# Patient Record
Sex: MALE | Race: OTHER | NOT HISPANIC OR LATINO | Employment: OTHER | URBAN - METROPOLITAN AREA
[De-identification: names, ages, dates, MRNs, and addresses within clinical notes are randomized per-mention and may not be internally consistent; named-entity substitution may affect disease eponyms.]

---

## 2021-06-13 ENCOUNTER — NURSE TRIAGE (OUTPATIENT)
Dept: HEALTH INFORMATION MANAGEMENT | Facility: OTHER | Age: 26
End: 2021-06-13

## 2021-06-13 ENCOUNTER — HOSPITAL ENCOUNTER (EMERGENCY)
Facility: MEDICAL CENTER | Age: 26
End: 2021-06-13
Attending: EMERGENCY MEDICINE
Payer: MEDICAID

## 2021-06-13 ENCOUNTER — APPOINTMENT (OUTPATIENT)
Dept: RADIOLOGY | Facility: MEDICAL CENTER | Age: 26
End: 2021-06-13
Attending: EMERGENCY MEDICINE
Payer: MEDICAID

## 2021-06-13 VITALS
BODY MASS INDEX: 24.27 KG/M2 | RESPIRATION RATE: 20 BRPM | HEART RATE: 84 BPM | TEMPERATURE: 98 F | SYSTOLIC BLOOD PRESSURE: 132 MMHG | HEIGHT: 70 IN | WEIGHT: 169.53 LBS | OXYGEN SATURATION: 98 % | DIASTOLIC BLOOD PRESSURE: 61 MMHG

## 2021-06-13 DIAGNOSIS — S42.151A CLOSED FRACTURE OF GLENOID CAVITY AND NECK OF RIGHT SCAPULA, INITIAL ENCOUNTER: ICD-10-CM

## 2021-06-13 DIAGNOSIS — S43.004A DISLOCATION OF RIGHT SHOULDER JOINT, INITIAL ENCOUNTER: ICD-10-CM

## 2021-06-13 DIAGNOSIS — S42.141A CLOSED FRACTURE OF GLENOID CAVITY AND NECK OF RIGHT SCAPULA, INITIAL ENCOUNTER: ICD-10-CM

## 2021-06-13 PROCEDURE — 96374 THER/PROPH/DIAG INJ IV PUSH: CPT | Mod: XU

## 2021-06-13 PROCEDURE — 99285 EMERGENCY DEPT VISIT HI MDM: CPT

## 2021-06-13 PROCEDURE — 73030 X-RAY EXAM OF SHOULDER: CPT | Mod: RT

## 2021-06-13 PROCEDURE — 23650 CLTX SHO DSLC W/MNPJ WO ANES: CPT

## 2021-06-13 PROCEDURE — 96375 TX/PRO/DX INJ NEW DRUG ADDON: CPT | Mod: XU

## 2021-06-13 PROCEDURE — 700111 HCHG RX REV CODE 636 W/ 250 OVERRIDE (IP): Performed by: EMERGENCY MEDICINE

## 2021-06-13 PROCEDURE — 94799 UNLISTED PULMONARY SVC/PX: CPT

## 2021-06-13 RX ORDER — PROPOFOL 10 MG/ML
200 INJECTION, EMULSION INTRAVENOUS ONCE
Status: DISCONTINUED | OUTPATIENT
Start: 2021-06-13 | End: 2021-06-13 | Stop reason: HOSPADM

## 2021-06-13 RX ORDER — PROPOFOL 10 MG/ML
INJECTION, EMULSION INTRAVENOUS
Status: COMPLETED | OUTPATIENT
Start: 2021-06-13 | End: 2021-06-13

## 2021-06-13 RX ORDER — MESALAMINE 1.2 G/1
1.2 TABLET, DELAYED RELEASE ORAL
COMMUNITY

## 2021-06-13 RX ORDER — HYDROCODONE BITARTRATE AND ACETAMINOPHEN 5; 325 MG/1; MG/1
1 TABLET ORAL EVERY 6 HOURS PRN
Qty: 10 TABLET | Refills: 0 | Status: SHIPPED | OUTPATIENT
Start: 2021-06-13 | End: 2021-06-16

## 2021-06-13 RX ORDER — ONDANSETRON 2 MG/ML
4 INJECTION INTRAMUSCULAR; INTRAVENOUS ONCE
Status: COMPLETED | OUTPATIENT
Start: 2021-06-13 | End: 2021-06-13

## 2021-06-13 RX ADMIN — PROPOFOL 25 MG: 10 INJECTION, EMULSION INTRAVENOUS at 15:06

## 2021-06-13 RX ADMIN — PROPOFOL 25 MG: 10 INJECTION, EMULSION INTRAVENOUS at 15:11

## 2021-06-13 RX ADMIN — PROPOFOL 25 MG: 10 INJECTION, EMULSION INTRAVENOUS at 15:05

## 2021-06-13 RX ADMIN — PROPOFOL 50 MG: 10 INJECTION, EMULSION INTRAVENOUS at 15:04

## 2021-06-13 RX ADMIN — PROPOFOL 25 MG: 10 INJECTION, EMULSION INTRAVENOUS at 15:08

## 2021-06-13 RX ADMIN — PROPOFOL 25 MG: 10 INJECTION, EMULSION INTRAVENOUS at 15:10

## 2021-06-13 RX ADMIN — FENTANYL CITRATE 50 MCG: 50 INJECTION, SOLUTION INTRAMUSCULAR; INTRAVENOUS at 14:00

## 2021-06-13 RX ADMIN — ONDANSETRON 4 MG: 2 INJECTION INTRAMUSCULAR; INTRAVENOUS at 13:59

## 2021-06-13 ASSESSMENT — PAIN DESCRIPTION - PAIN TYPE: TYPE: ACUTE PAIN

## 2021-06-13 NOTE — FLOWSHEET NOTE
06/13/21 1514   Events/Summary/Plan   Events/Summary/Plan RT at bedside for concious sedation tolerated well    Vital Signs   Pulse 86   Respiration 18   Pulse Oximetry 98 %   CO2 Monitoring   ETCO2 (mmHg) 32   $ ETCO2 Monitoring Yes

## 2021-06-13 NOTE — ED NOTES
All DC discussed. Pt verbalized understanding of all DC instructions r/t shoulder dislocation, prescriptions including opioid medications and consent and follow up recommendations. Pt ambulated to lobby with upright and steady gait. Sling applied to R shoulder.

## 2021-06-13 NOTE — ED NOTES
Pt had vagal response to IV insertion. Per pt felt faint. Clammy upon assessment. Pt repositioned to lying position, VS taken.

## 2021-06-13 NOTE — ED TRIAGE NOTES
To triage via w/c with report of   Chief Complaint   Patient presents with   • T-5000 Extremity Pain     right shoulder, possible dislocation   reports was practicing [employer] today.  Reports Hx of right shoulder dislocation with surgical intervention.  Reports today feels different than previous dislocations.  Difficult to assess for deformity due to pt position and pain tolerance in triage.  CMS intact with limited movement due to pain

## 2021-06-13 NOTE — DISCHARGE INSTRUCTIONS
Do not try to raise your arm as you may dislocate your arm again.  You must follow-up with orthopedics concerning the small fracture.  Take Tylenol or ibuprofen for mild pain.  Take the narcotic pain medication for severe pain.  Ice your arm.  Any numbness tingling or worsening pain return.

## 2021-06-13 NOTE — ED NOTES
Pt from ED lobby to B13. Pt reports sudden onset of pain when grabbling. Right shoulder pain. ROM limited by pain. Pt report position of comfort is arm held out in front of him.

## 2021-06-13 NOTE — ED PROVIDER NOTES
ED Provider Note    Scribed for Bree Ribeiro M.D. by Gideon Anderson. 6/13/2021, 1:46 PM.    Primary care provider: PCP, Pt states none  Means of arrival: Wheel chair  History obtained from: Patient  History limited by: None    CHIEF COMPLAINT  Chief Complaint   Patient presents with   • T-5000 Extremity Pain     right shoulder, possible dislocation       HPI  John Paul Shoemaker is a 25 y.o. male, with a history of shoulder dislocation, who presents to the Emergency Department for ongoing right shoulder pain with an onset of earlier today. The patient states he was practicing [employer] today when he injured his shoulder. He states he had a previous dislocation, but his symptoms today does feel like a dislocation. Keeping his right arm up in an L position is a mild alleviating factor. Exacerbating factors include rotating his shoulder to touch the left side of his chest. He reports associated dizziness secondary to pain. He denies any associated weakness, numbness, or tingling. The patient had a sandwich this morning for breakfast.     REVIEW OF SYSTEMS  Pertinent positives include right shoulder pain, and dizziness. Pertinent negatives include no weakness, numbness, tingling. All other systems reviewed and negative.     PAST MEDICAL HISTORY   has a past medical history of Migraines, Social anxiety disorder, and Ulcerative colitis (HCC).    SURGICAL HISTORY   has a past surgical history that includes shoulder arthroscopy (Right).    SOCIAL HISTORY  Social History     Tobacco Use   • Smoking status: Never Smoker   • Smokeless tobacco: Never Used   Vaping Use   • Vaping Use: Never used   Substance Use Topics   • Alcohol use: Yes     Comment: occ   • Drug use: Never      Social History     Substance and Sexual Activity   Drug Use Never       FAMILY HISTORY  History reviewed. No pertinent family history.    CURRENT MEDICATIONS  Home Medications    **Home medications have not yet been reviewed for this encounter**          ALLERGIES  No Known Allergies    PHYSICAL EXAM  VITAL SIGNS: /71   Pulse 94   Temp 36.7 °C (98 °F) (Temporal)   Resp 16   Wt 76.9 kg (169 lb 8.5 oz)   SpO2 98% Comment: Ra    Constitutional: Well developed, Well nourished, No acute distress, Non-toxic appearance.   Cardiovascular: Good pulses on the affected extremity. Good capillary refill.  Thorax & Lungs: No respiratory distress  Skin: No abrasions  Musculoskeletal: Obvious deformity in the right shoulder, difficulty abducting the right arm. Intact sensations, good  strength, good pulses.   Neurologic: Good sensation to light touch on the distal affected extremity.                                                    DIAGNOSTIC STUDIES / PROCEDURES\    RADIOLOGY  DX-SHOULDER 2+ RIGHT   Final Result         Interval reduction of the right shoulder dislocation.   Acute mildly displaced fracture of the anteroinferior glenoid      DX-SHOULDER 2+ RIGHT   Final Result      Anterior shoulder dislocation.               INTERPRETING LOCATION:  [address] [place] [address], [facility]        The radiologist's interpretation of all radiological studies have been reviewed by me.    Conscious Sedation Procedure Note    Indication: shoulder dislocation    Consent: I have discussed with the patient and/or the patient representative the indication, alternatives, and the possible risks and/or complications of the planned procedure and the anesthesia methods. The patient and/or patient representative appear to understand and agree to proceed.    Physician Involvement: The attending physician was present and supervising this procedure.    Pre-Sedation Documentation and Exam: I have personally completed a history, physical exam & review of systems for this patient (see notes).  Airway Assessment: normal  f3  Prior History of Anesthesia Complications: none    ASA Classification: Class 1 - A normal healthy patient    Sedation/ Anesthesia Plan: intravenous  sedation    Medications Used: propofol intravenously    Monitoring and Safety: The patient was placed on a cardiac monitor and vital signs, pulse oximetry and level of consciousness were continuously evaluated throughout the procedure. The patient was closely monitored until recovery from the medications was complete and the patient had returned to baseline status. Respiratory therapy was on standby at all times during the procedure.      (The following sections must be completed)  Post-Sedation Vital Signs: Vital signs were reviewed and were stable after the procedure (see flow sheet for vitals)            Intraservice Time: Greater than 10 minutes    Post-Sedation Exam: Lungs: clear           Complications: none    I provided both the sedation and procedure, a nurse was present at the bedside for the entire procedure.     Joint Reduction Procedure Note    Indication: Joint dislocation    Consent: The patient was counseled regarding the procedure, it's indications, risks, potential complications and alternatives and any questions were answered. Consent was obtained.    Procedure: The pre-reduction exam showed distal perfusion & neurologic function to be normal. The patient was placed in the sitting position. Anesthesia/pain control was obtained using conscious sedation -SEE CONSCIOUS SEDATION NOTE FOR DETAILS. Reduction of the right shoulder was performed by direct traction. Post reduction films were obtained and revealed satisfactory reduction. A post-reduction exam revealed distal perfusion & neurologic function to be normal. The affected area was immobilized with an arm sling.    The patient tolerated the procedure well.    Complications: None    COURSE & MEDICAL DECISION MAKING  Nursing notes, VS, PMSFHx reviewed in chart.     1:46 PM Patient seen and examined at bedside. Discussed plan of care with patient. I informed them that imaging will be ordered to evaluate symptoms. Patient is understanding and  agreeable with plan.   Ordered for DX right shoulder to evaluate. Patient was treated with Zofran 4 mg, and Fentanyl 50 mcg for his symptoms.    he presented to the emergency department with an obvious shoulder dislocation.  Patient was neurovascularly intact.  See procedure note for sedation and reduction of dislocation.  Very gentle traction was applied and it was easily reduced.  The post reduction x-ray showed evidence of a fracture however I do not think this was related to the reduction as it really did not pull very hard and essentially is a muscles relaxed when he was given the propofol and it went easily in place.  He is neurovascular intact after the reduction.  He is awake and alert.  He does not currently have any pain.  He is advised to follow-up with orthopedics.    In prescribing controlled substances to this patient, I certify that I have obtained and reviewed the medical history of John Paul Shoemaker. I have also made a good bernie effort to obtain applicable records from other providers who have treated the patient and no other records are available at this time.     I have conducted a physical exam and documented it. I have reviewed Mr. Shoemaker’s prescription history as maintained by the [place] Prescription Monitoring Program.     I have assessed the patient’s risk for abuse, dependency, and addiction using the validated Opioid Risk Tool available at https://www.mdcalc.com/bssxmj-wpfy-urup-ort-narcotic-abuse.     Given the above, I believe the benefits of controlled substance therapy outweigh the risks. The reasons for prescribing controlled substances include non-narcotic, oral analgesic alternatives have been inadequate for pain control. Accordingly, I have discussed the risk and benefits, treatment plan, and alternative therapies with the patient.     FINAL IMPRESSION  1. Dislocation of right shoulder joint, initial encounter    2. Closed fracture of glenoid cavity and neck of right scapula,  initial encounter       Conscious sedation  Joint reduction     Gideon GUERRERO (Scribe), am scribing for, and in the presence of, Bree Ribeiro M.D..    Electronically signed by: Gideon Anderson (Cuco), 6/13/2021    Bree GUERRERO M.D. personally performed the services described in this documentation, as scribed by Gideon Anderson in my presence, and it is both accurate and complete.    C    The note accurately reflects work and decisions made by me.  Bree Ribeiro M.D.  6/13/2021  4:06 PM

## 2021-06-13 NOTE — TELEPHONE ENCOUNTER
"Pt teacher calling in on behalf of pt. No personal information given d/t inability to speak with pt. Teacher believes shoulder is poppped out of place. Has happeend to pt before but unable to get it popped back in himself this time. Advised on how to position arm and to take to ER now. If unable to get pt positioned and into car, advised to call 911 for transportation. Teacher agrees.     Reason for Disposition  • Looks like a dislocated joint    Additional Information  • Negative: Serious injury with multiple fractures  • Negative: [1] Major bleeding (e.g., actively dripping or spurting) AND [2] can't be stopped  • Negative: Bullet wound, stabbed by knife, or other serious penetrating wound  • Negative: Sounds like a life-threatening emergency to the triager  • Negative: Wound looks infected  • Negative: Shoulder pain from overuse (work, exercise, gardening) OR from self-induced lifting injury  • Negative: Shoulder pain not from an injury  • Negative: Looks like a broken bone (crooked or deformed)    Answer Assessment - Initial Assessment Questions  1. MECHANISM: \"How did the injury happen?\"      unsure  2. ONSET: \"When did the injury happen?\" (Minutes or hours ago)       Few minutes ago  3. APPEARANCE of INJURY: \"What does the injury look like?\"       Out of place  4. SEVERITY: \"Can you move the shoulder normally?\"       no  5. SIZE: For cuts, bruises, or swelling, ask: \"How large is it?\" (e.g., inches or centimeters;  entire joint)       unsure  6. PAIN: \"Is there pain?\" If so, ask: \"How bad is the pain?\"   (e.g., Scale 1-10; or mild, moderate, severe)      severe  7. TETANUS: For any breaks in the skin, ask: \"When was the last tetanus booster?\"      unsure  8. OTHER SYMPTOMS: \"Do you have any other symptoms?\" (e.g., loss of sensation)      no  9. PREGNANCY: \"Is there any chance you are pregnant?\" \"When was your last menstrual period?\"      no    Protocols used: SHOULDER INJURY-A-AH      "

## 2021-06-13 NOTE — ED NOTES
Reduction completed by Dr. Ribeiro at 1510. Pt awake, alert, and conversant at this time. CMS intact to R hand, sling in place

## 2021-06-15 ENCOUNTER — NURSE TRIAGE (OUTPATIENT)
Dept: HEALTH INFORMATION MANAGEMENT | Facility: OTHER | Age: 26
End: 2021-06-15

## 2021-06-15 NOTE — TELEPHONE ENCOUNTER
Pt was in ED @ [facility] for dislocated shoulder on 6/13, was to receive an orthopedic consult & have not heard from same. Triage nurse called Dr. Acevedo's office & left phone message w/triage nurse @ that office.

## 2021-06-17 PROBLEM — M24.411 CHRONIC DISLOCATION OF RIGHT SHOULDER: Status: ACTIVE | Noted: 2021-06-17

## 2021-07-12 ENCOUNTER — HOSPITAL ENCOUNTER (OUTPATIENT)
Dept: LAB | Facility: MEDICAL CENTER | Age: 26
End: 2021-07-12
Attending: STUDENT IN AN ORGANIZED HEALTH CARE EDUCATION/TRAINING PROGRAM
Payer: MEDICAID

## 2021-07-12 LAB
ALBUMIN SERPL BCP-MCNC: 4.7 G/DL (ref 3.2–4.9)
ALBUMIN/GLOB SERPL: 1.9 G/DL
ALP SERPL-CCNC: 61 U/L (ref 30–99)
ALT SERPL-CCNC: 18 U/L (ref 2–50)
ANION GAP SERPL CALC-SCNC: 8 MMOL/L (ref 7–16)
AST SERPL-CCNC: 22 U/L (ref 12–45)
BASOPHILS # BLD AUTO: 1.2 % (ref 0–1.8)
BASOPHILS # BLD: 0.06 K/UL (ref 0–0.12)
BILIRUB SERPL-MCNC: 1.2 MG/DL (ref 0.1–1.5)
BUN SERPL-MCNC: 16 MG/DL (ref 8–22)
CALCIUM SERPL-MCNC: 9.6 MG/DL (ref 8.5–10.5)
CHLORIDE SERPL-SCNC: 102 MMOL/L (ref 96–112)
CHOLEST SERPL-MCNC: 216 MG/DL (ref 100–199)
CO2 SERPL-SCNC: 28 MMOL/L (ref 20–33)
CREAT SERPL-MCNC: 1.06 MG/DL (ref 0.5–1.4)
EOSINOPHIL # BLD AUTO: 0.06 K/UL (ref 0–0.51)
EOSINOPHIL NFR BLD: 1.2 % (ref 0–6.9)
ERYTHROCYTE [DISTWIDTH] IN BLOOD BY AUTOMATED COUNT: 38.9 FL (ref 35.9–50)
GLOBULIN SER CALC-MCNC: 2.5 G/DL (ref 1.9–3.5)
GLUCOSE SERPL-MCNC: 87 MG/DL (ref 65–99)
HCT VFR BLD AUTO: 48.3 % (ref 42–52)
HDLC SERPL-MCNC: 76 MG/DL
HGB BLD-MCNC: 15.3 G/DL (ref 14–18)
IMM GRANULOCYTES # BLD AUTO: 0.02 K/UL (ref 0–0.11)
IMM GRANULOCYTES NFR BLD AUTO: 0.4 % (ref 0–0.9)
LDLC SERPL CALC-MCNC: 127 MG/DL
LYMPHOCYTES # BLD AUTO: 2.06 K/UL (ref 1–4.8)
LYMPHOCYTES NFR BLD: 39.9 % (ref 22–41)
MCH RBC QN AUTO: 26.3 PG (ref 27–33)
MCHC RBC AUTO-ENTMCNC: 31.7 G/DL (ref 33.7–35.3)
MCV RBC AUTO: 83.1 FL (ref 81.4–97.8)
MONOCYTES # BLD AUTO: 0.3 K/UL (ref 0–0.85)
MONOCYTES NFR BLD AUTO: 5.8 % (ref 0–13.4)
NEUTROPHILS # BLD AUTO: 2.66 K/UL (ref 1.82–7.42)
NEUTROPHILS NFR BLD: 51.5 % (ref 44–72)
NRBC # BLD AUTO: 0 K/UL
NRBC BLD-RTO: 0 /100 WBC
PLATELET # BLD AUTO: 239 K/UL (ref 164–446)
PMV BLD AUTO: 10.3 FL (ref 9–12.9)
POTASSIUM SERPL-SCNC: 4.2 MMOL/L (ref 3.6–5.5)
PROT SERPL-MCNC: 7.2 G/DL (ref 6–8.2)
RBC # BLD AUTO: 5.81 M/UL (ref 4.7–6.1)
SODIUM SERPL-SCNC: 138 MMOL/L (ref 135–145)
TRIGL SERPL-MCNC: 65 MG/DL (ref 0–149)
TSH SERPL DL<=0.005 MIU/L-ACNC: 1.11 UIU/ML (ref 0.38–5.33)
WBC # BLD AUTO: 5.2 K/UL (ref 4.8–10.8)

## 2021-07-12 PROCEDURE — 84443 ASSAY THYROID STIM HORMONE: CPT

## 2021-07-12 PROCEDURE — 80061 LIPID PANEL: CPT

## 2021-07-12 PROCEDURE — 80053 COMPREHEN METABOLIC PANEL: CPT

## 2021-07-12 PROCEDURE — 85025 COMPLETE CBC W/AUTO DIFF WBC: CPT

## 2021-07-12 PROCEDURE — 36415 COLL VENOUS BLD VENIPUNCTURE: CPT

## 2021-07-16 ENCOUNTER — TELEMEDICINE (OUTPATIENT)
Dept: ADMISSIONS | Facility: MEDICAL CENTER | Age: 26
End: 2021-07-16
Attending: ORTHOPAEDIC SURGERY
Payer: MEDICAID

## 2021-07-16 RX ORDER — IBUPROFEN 600 MG/1
600 TABLET ORAL EVERY 6 HOURS PRN
COMMUNITY

## 2021-07-16 RX ORDER — ACETAMINOPHEN 325 MG/1
TABLET ORAL
COMMUNITY
End: 2021-07-16

## 2021-07-16 RX ORDER — FLUOXETINE 10 MG/1
10 CAPSULE ORAL
COMMUNITY
Start: 2021-06-11 | End: 2021-07-16

## 2021-07-16 RX ORDER — FLUOXETINE HYDROCHLORIDE 20 MG/1
CAPSULE ORAL
COMMUNITY
Start: 2021-06-15 | End: 2022-01-27 | Stop reason: SDUPTHER

## 2021-07-16 NOTE — OR NURSING
"Preadmit appointment: \" Preparing for your Procedure information\" sheet given to patient with verbal and written instructions. Patient instructed to continue prescribed medications through the day before surgery, instructed to take the following medications the day of surgery per anesthesia protocol: NONE          Verbal and written instructions on [facility]'s recommendation to wear a mask in public and with persons known to not to had had the Covid-19 vaccination prior to surgery and covid symptoms to watch for given to patient, pt advised to notify MD if any symptoms develop.        "

## 2021-07-28 ENCOUNTER — ANESTHESIA EVENT (OUTPATIENT)
Dept: SURGERY | Facility: MEDICAL CENTER | Age: 26
End: 2021-07-28
Payer: MEDICAID

## 2021-07-28 ENCOUNTER — ANESTHESIA (OUTPATIENT)
Dept: SURGERY | Facility: MEDICAL CENTER | Age: 26
End: 2021-07-28
Payer: MEDICAID

## 2021-07-28 ENCOUNTER — HOSPITAL ENCOUNTER (OUTPATIENT)
Facility: MEDICAL CENTER | Age: 26
End: 2021-07-28
Attending: ORTHOPAEDIC SURGERY | Admitting: ORTHOPAEDIC SURGERY
Payer: MEDICAID

## 2021-07-28 VITALS
WEIGHT: 162.26 LBS | DIASTOLIC BLOOD PRESSURE: 75 MMHG | BODY MASS INDEX: 23.28 KG/M2 | TEMPERATURE: 97.3 F | SYSTOLIC BLOOD PRESSURE: 131 MMHG | RESPIRATION RATE: 16 BRPM | HEART RATE: 83 BPM | OXYGEN SATURATION: 98 %

## 2021-07-28 DIAGNOSIS — G89.18 POSTOPERATIVE PAIN: ICD-10-CM

## 2021-07-28 DIAGNOSIS — M24.411 CHRONIC DISLOCATION OF RIGHT SHOULDER: ICD-10-CM

## 2021-07-28 PROCEDURE — 700102 HCHG RX REV CODE 250 W/ 637 OVERRIDE(OP): Performed by: ORTHOPAEDIC SURGERY

## 2021-07-28 PROCEDURE — 700101 HCHG RX REV CODE 250: Performed by: ANESTHESIOLOGY

## 2021-07-28 PROCEDURE — 64415 NJX AA&/STRD BRCH PLXS IMG: CPT | Performed by: ORTHOPAEDIC SURGERY

## 2021-07-28 PROCEDURE — 160029 HCHG SURGERY MINUTES - 1ST 30 MINS LEVEL 4: Performed by: ORTHOPAEDIC SURGERY

## 2021-07-28 PROCEDURE — 160025 RECOVERY II MINUTES (STATS): Performed by: ORTHOPAEDIC SURGERY

## 2021-07-28 PROCEDURE — 160048 HCHG OR STATISTICAL LEVEL 1-5: Performed by: ORTHOPAEDIC SURGERY

## 2021-07-28 PROCEDURE — 29827 SHO ARTHRS SRG RT8TR CUF RPR: CPT | Mod: ASROC,RT | Performed by: PHYSICIAN ASSISTANT

## 2021-07-28 PROCEDURE — 20680 REMOVAL OF IMPLANT DEEP: CPT | Mod: ASROC,51ROC | Performed by: PHYSICIAN ASSISTANT

## 2021-07-28 PROCEDURE — 700101 HCHG RX REV CODE 250: Performed by: ORTHOPAEDIC SURGERY

## 2021-07-28 PROCEDURE — 20680 REMOVAL OF IMPLANT DEEP: CPT | Mod: 51ROC | Performed by: ORTHOPAEDIC SURGERY

## 2021-07-28 PROCEDURE — A9270 NON-COVERED ITEM OR SERVICE: HCPCS | Performed by: ORTHOPAEDIC SURGERY

## 2021-07-28 PROCEDURE — 160046 HCHG PACU - 1ST 60 MINS PHASE II: Performed by: ORTHOPAEDIC SURGERY

## 2021-07-28 PROCEDURE — 700105 HCHG RX REV CODE 258: Performed by: ORTHOPAEDIC SURGERY

## 2021-07-28 PROCEDURE — 501838 HCHG SUTURE GENERAL: Performed by: ORTHOPAEDIC SURGERY

## 2021-07-28 PROCEDURE — C1713 ANCHOR/SCREW BN/BN,TIS/BN: HCPCS | Performed by: ORTHOPAEDIC SURGERY

## 2021-07-28 PROCEDURE — 160036 HCHG PACU - EA ADDL 30 MINS PHASE I: Performed by: ORTHOPAEDIC SURGERY

## 2021-07-28 PROCEDURE — 700102 HCHG RX REV CODE 250 W/ 637 OVERRIDE(OP): Performed by: ANESTHESIOLOGY

## 2021-07-28 PROCEDURE — 160002 HCHG RECOVERY MINUTES (STAT): Performed by: ORTHOPAEDIC SURGERY

## 2021-07-28 PROCEDURE — 29827 SHO ARTHRS SRG RT8TR CUF RPR: CPT | Mod: 51ROC,59 | Performed by: ORTHOPAEDIC SURGERY

## 2021-07-28 PROCEDURE — 502581 HCHG PACK, SHOULDER ARTHROSCOPY: Performed by: ORTHOPAEDIC SURGERY

## 2021-07-28 PROCEDURE — 160041 HCHG SURGERY MINUTES - EA ADDL 1 MIN LEVEL 4: Performed by: ORTHOPAEDIC SURGERY

## 2021-07-28 PROCEDURE — 29806 SHO ARTHRS SRG CAPSULORRAPHY: CPT | Mod: RT | Performed by: ORTHOPAEDIC SURGERY

## 2021-07-28 PROCEDURE — 160009 HCHG ANES TIME/MIN: Performed by: ORTHOPAEDIC SURGERY

## 2021-07-28 PROCEDURE — 700111 HCHG RX REV CODE 636 W/ 250 OVERRIDE (IP): Performed by: ANESTHESIOLOGY

## 2021-07-28 PROCEDURE — 502000 HCHG MISC OR IMPLANTS RC 0278: Performed by: ORTHOPAEDIC SURGERY

## 2021-07-28 PROCEDURE — A9270 NON-COVERED ITEM OR SERVICE: HCPCS | Performed by: ANESTHESIOLOGY

## 2021-07-28 PROCEDURE — 160035 HCHG PACU - 1ST 60 MINS PHASE I: Performed by: ORTHOPAEDIC SURGERY

## 2021-07-28 DEVICE — ANCHOR SUTURE ICONIX 3 WITH 3 STRANDS #2 FORCE FIBER 2.3MM (5EA/BX): Type: IMPLANTABLE DEVICE | Site: SHOULDER | Status: FUNCTIONAL

## 2021-07-28 DEVICE — IMPLANTABLE DEVICE: Type: IMPLANTABLE DEVICE | Site: SHOULDER | Status: FUNCTIONAL

## 2021-07-28 RX ORDER — OXYCODONE HYDROCHLORIDE AND ACETAMINOPHEN 5; 325 MG/1; MG/1
1 TABLET ORAL EVERY 6 HOURS PRN
Qty: 40 TABLET | Refills: 0 | Status: SHIPPED | OUTPATIENT
Start: 2021-07-28 | End: 2021-08-04

## 2021-07-28 RX ORDER — HYDROMORPHONE HYDROCHLORIDE 1 MG/ML
0.1 INJECTION, SOLUTION INTRAMUSCULAR; INTRAVENOUS; SUBCUTANEOUS
Status: DISCONTINUED | OUTPATIENT
Start: 2021-07-28 | End: 2021-07-28 | Stop reason: HOSPADM

## 2021-07-28 RX ORDER — OXYCODONE HCL 5 MG/5 ML
5 SOLUTION, ORAL ORAL
Status: COMPLETED | OUTPATIENT
Start: 2021-07-28 | End: 2021-07-28

## 2021-07-28 RX ORDER — HYDRALAZINE HYDROCHLORIDE 20 MG/ML
5 INJECTION INTRAMUSCULAR; INTRAVENOUS
Status: DISCONTINUED | OUTPATIENT
Start: 2021-07-28 | End: 2021-07-28 | Stop reason: HOSPADM

## 2021-07-28 RX ORDER — HYDROMORPHONE HYDROCHLORIDE 1 MG/ML
0.2 INJECTION, SOLUTION INTRAMUSCULAR; INTRAVENOUS; SUBCUTANEOUS
Status: DISCONTINUED | OUTPATIENT
Start: 2021-07-28 | End: 2021-07-28 | Stop reason: HOSPADM

## 2021-07-28 RX ORDER — MIDAZOLAM HYDROCHLORIDE 1 MG/ML
INJECTION INTRAMUSCULAR; INTRAVENOUS PRN
Status: DISCONTINUED | OUTPATIENT
Start: 2021-07-28 | End: 2021-07-28 | Stop reason: SURG

## 2021-07-28 RX ORDER — SODIUM CHLORIDE, SODIUM LACTATE, POTASSIUM CHLORIDE, CALCIUM CHLORIDE 600; 310; 30; 20 MG/100ML; MG/100ML; MG/100ML; MG/100ML
INJECTION, SOLUTION INTRAVENOUS CONTINUOUS
Status: DISCONTINUED | OUTPATIENT
Start: 2021-07-28 | End: 2021-07-28 | Stop reason: HOSPADM

## 2021-07-28 RX ORDER — HYDROMORPHONE HYDROCHLORIDE 1 MG/ML
0.4 INJECTION, SOLUTION INTRAMUSCULAR; INTRAVENOUS; SUBCUTANEOUS
Status: DISCONTINUED | OUTPATIENT
Start: 2021-07-28 | End: 2021-07-28 | Stop reason: HOSPADM

## 2021-07-28 RX ORDER — ONDANSETRON 4 MG/1
4 TABLET, FILM COATED ORAL EVERY 4 HOURS PRN
Qty: 20 TABLET | Refills: 0 | Status: SHIPPED | OUTPATIENT
Start: 2021-07-28

## 2021-07-28 RX ORDER — CEFAZOLIN SODIUM 1 G/3ML
INJECTION, POWDER, FOR SOLUTION INTRAMUSCULAR; INTRAVENOUS PRN
Status: DISCONTINUED | OUTPATIENT
Start: 2021-07-28 | End: 2021-07-28 | Stop reason: SURG

## 2021-07-28 RX ORDER — MEPERIDINE HYDROCHLORIDE 25 MG/ML
6.25 INJECTION INTRAMUSCULAR; INTRAVENOUS; SUBCUTANEOUS
Status: DISCONTINUED | OUTPATIENT
Start: 2021-07-28 | End: 2021-07-28 | Stop reason: HOSPADM

## 2021-07-28 RX ORDER — ONDANSETRON 2 MG/ML
4 INJECTION INTRAMUSCULAR; INTRAVENOUS
Status: DISCONTINUED | OUTPATIENT
Start: 2021-07-28 | End: 2021-07-28 | Stop reason: HOSPADM

## 2021-07-28 RX ORDER — LIDOCAINE HYDROCHLORIDE 20 MG/ML
INJECTION, SOLUTION EPIDURAL; INFILTRATION; INTRACAUDAL; PERINEURAL PRN
Status: DISCONTINUED | OUTPATIENT
Start: 2021-07-28 | End: 2021-07-28 | Stop reason: SURG

## 2021-07-28 RX ORDER — DIPHENHYDRAMINE HYDROCHLORIDE 50 MG/ML
12.5 INJECTION INTRAMUSCULAR; INTRAVENOUS
Status: DISCONTINUED | OUTPATIENT
Start: 2021-07-28 | End: 2021-07-28 | Stop reason: HOSPADM

## 2021-07-28 RX ORDER — BUPIVACAINE HYDROCHLORIDE 2.5 MG/ML
INJECTION, SOLUTION EPIDURAL; INFILTRATION; INTRACAUDAL PRN
Status: DISCONTINUED | OUTPATIENT
Start: 2021-07-28 | End: 2021-07-28 | Stop reason: SURG

## 2021-07-28 RX ORDER — OXYCODONE HCL 5 MG/5 ML
10 SOLUTION, ORAL ORAL
Status: COMPLETED | OUTPATIENT
Start: 2021-07-28 | End: 2021-07-28

## 2021-07-28 RX ORDER — SODIUM CHLORIDE, SODIUM LACTATE, POTASSIUM CHLORIDE, CALCIUM CHLORIDE 600; 310; 30; 20 MG/100ML; MG/100ML; MG/100ML; MG/100ML
INJECTION, SOLUTION INTRAVENOUS CONTINUOUS
Status: ACTIVE | OUTPATIENT
Start: 2021-07-28 | End: 2021-07-28

## 2021-07-28 RX ORDER — LIDOCAINE HYDROCHLORIDE 10 MG/ML
INJECTION, SOLUTION INFILTRATION; PERINEURAL
Status: DISCONTINUED
Start: 2021-07-28 | End: 2021-07-28 | Stop reason: HOSPADM

## 2021-07-28 RX ORDER — DEXAMETHASONE SODIUM PHOSPHATE 4 MG/ML
INJECTION, SOLUTION INTRA-ARTICULAR; INTRALESIONAL; INTRAMUSCULAR; INTRAVENOUS; SOFT TISSUE PRN
Status: DISCONTINUED | OUTPATIENT
Start: 2021-07-28 | End: 2021-07-28 | Stop reason: SURG

## 2021-07-28 RX ORDER — HALOPERIDOL 5 MG/ML
1 INJECTION INTRAMUSCULAR
Status: DISCONTINUED | OUTPATIENT
Start: 2021-07-28 | End: 2021-07-28 | Stop reason: HOSPADM

## 2021-07-28 RX ORDER — ONDANSETRON 2 MG/ML
INJECTION INTRAMUSCULAR; INTRAVENOUS PRN
Status: DISCONTINUED | OUTPATIENT
Start: 2021-07-28 | End: 2021-07-28 | Stop reason: SURG

## 2021-07-28 RX ORDER — LABETALOL HYDROCHLORIDE 5 MG/ML
5 INJECTION, SOLUTION INTRAVENOUS
Status: DISCONTINUED | OUTPATIENT
Start: 2021-07-28 | End: 2021-07-28 | Stop reason: HOSPADM

## 2021-07-28 RX ADMIN — LIDOCAINE HYDROCHLORIDE 0.5 ML: 10 INJECTION, SOLUTION INFILTRATION; PERINEURAL at 09:52

## 2021-07-28 RX ADMIN — PROPOFOL 200 MG: 10 INJECTION, EMULSION INTRAVENOUS at 10:28

## 2021-07-28 RX ADMIN — SODIUM CHLORIDE, POTASSIUM CHLORIDE, SODIUM LACTATE AND CALCIUM CHLORIDE: 600; 310; 30; 20 INJECTION, SOLUTION INTRAVENOUS at 09:52

## 2021-07-28 RX ADMIN — BUPIVACAINE HYDROCHLORIDE 25 ML: 2.5 INJECTION, SOLUTION EPIDURAL; INFILTRATION; INTRACAUDAL; PERINEURAL at 10:17

## 2021-07-28 RX ADMIN — CEFAZOLIN 2 G: 330 INJECTION, POWDER, FOR SOLUTION INTRAMUSCULAR; INTRAVENOUS at 10:29

## 2021-07-28 RX ADMIN — POVIDONE IODINE 15 ML: 100 SOLUTION TOPICAL at 09:52

## 2021-07-28 RX ADMIN — LIDOCAINE HYDROCHLORIDE 50 MG: 20 INJECTION, SOLUTION EPIDURAL; INFILTRATION; INTRACAUDAL; PERINEURAL at 10:28

## 2021-07-28 RX ADMIN — MIDAZOLAM HYDROCHLORIDE 2 MG: 1 INJECTION, SOLUTION INTRAMUSCULAR; INTRAVENOUS at 10:21

## 2021-07-28 RX ADMIN — SODIUM CHLORIDE, POTASSIUM CHLORIDE, SODIUM LACTATE AND CALCIUM CHLORIDE: 600; 310; 30; 20 INJECTION, SOLUTION INTRAVENOUS at 10:21

## 2021-07-28 RX ADMIN — PROPOFOL 100 MG: 10 INJECTION, EMULSION INTRAVENOUS at 10:29

## 2021-07-28 RX ADMIN — ONDANSETRON 4 MG: 2 INJECTION INTRAMUSCULAR; INTRAVENOUS at 10:29

## 2021-07-28 RX ADMIN — EPHEDRINE SULFATE 10 MG: 50 INJECTION INTRAMUSCULAR; INTRAVENOUS; SUBCUTANEOUS at 11:08

## 2021-07-28 RX ADMIN — OXYCODONE HYDROCHLORIDE 5 MG: 5 SOLUTION ORAL at 13:02

## 2021-07-28 RX ADMIN — DEXAMETHASONE SODIUM PHOSPHATE 8 MG: 4 INJECTION, SOLUTION INTRAMUSCULAR; INTRAVENOUS at 10:29

## 2021-07-28 RX ADMIN — FENTANYL CITRATE 50 MCG: 50 INJECTION, SOLUTION INTRAMUSCULAR; INTRAVENOUS at 11:35

## 2021-07-28 ASSESSMENT — PAIN SCALES - GENERAL: PAIN_LEVEL: 3

## 2021-07-28 ASSESSMENT — PAIN DESCRIPTION - PAIN TYPE
TYPE: SURGICAL PAIN

## 2021-07-28 ASSESSMENT — FIBROSIS 4 INDEX: FIB4 SCORE: 0.52

## 2021-07-28 NOTE — ANESTHESIA PROCEDURE NOTES
Airway    Date/Time: 7/28/2021 10:28 AM  Performed by: Edgar Montero M.D.  Authorized by: Edgar Montero M.D.     Location:  OR  Urgency:  Elective  Indications for Airway Management:  Anesthesia      Spontaneous Ventilation: absent    Sedation Level:  Deep  Preoxygenated: Yes    Final Airway Type:  Supraglottic airway  Final Supraglottic Airway:  Standard LMA    SGA Size:  4  Number of Attempts at Approach:  1

## 2021-07-28 NOTE — H&P
"Surgery Orthopedic History & Physical Note    Date  7/28/2021    Primary Care Physician  Pcp Unknown    CC  Pre-Op Diagnosis Codes:     * Shoulder dislocation, right, initial encounter [S43.004A]    HPI  This is a 25 y.o. male who presented with recurrent R shoulder dislocations. His shoulder feels unstable and limits his activity.     Past Medical History:   Diagnosis Date   • Heart burn    • High cholesterol    • Jaundice     \"as a baby\"   • Migraine     Since age 8 yrs   • Migraines    • Social anxiety disorder    • Ulcerative colitis (HCC)        Past Surgical History:   Procedure Laterality Date   • ADENOIDECTOMY      s a small child   • DENTAL SURGERY  0226   • MAMMOPLASTY REDUCTION      Age 18   • SHOULDER ARTHROSCOPY Right        Current Facility-Administered Medications   Medication Dose Route Frequency Provider Last Rate Last Admin   • lidocaine (XYLOCAINE) 1 % injection 0.5 mL  0.5 mL Intradermal Once PRN Wali Seay M.D.   0.5 mL at 07/28/21 0952   • lactated ringers infusion   Intravenous Continuous Wali Seay M.D. 10 mL/hr at 07/28/21 0952 New Bag at 07/28/21 0952   • LIDOCAINE HCL 1 % INJ SOLN                Social History     Socioeconomic History   • Marital status: Single     Spouse name: Not on file   • Number of children: Not on file   • Years of education: Not on file   • Highest education level: Not on file   Occupational History   • Not on file   Tobacco Use   • Smoking status: Never Smoker   • Smokeless tobacco: Never Used   Vaping Use   • Vaping Use: Never used   Substance and Sexual Activity   • Alcohol use: Yes     Comment: 4-5/wk glasses o wine   • Drug use: Never   • Sexual activity: Not on file   Other Topics Concern   • Not on file   Social History Narrative   • Not on file     Social Determinants of Health     Financial Resource Strain:    • Difficulty of Paying Living Expenses:    Food Insecurity:    • Worried About Running Out of Food in the Last Year:    • Ran Out of Food in " the Last Year:    Transportation Needs:    • Lack of Transportation (Medical):    • Lack of Transportation (Non-Medical):    Physical Activity:    • Days of Exercise per Week:    • Minutes of Exercise per Session:    Stress:    • Feeling of Stress :    Social Connections:    • Frequency of Communication with Friends and Family:    • Frequency of Social Gatherings with Friends and Family:    • Attends Christian Services:    • Active Member of Clubs or Organizations:    • Attends Club or Organization Meetings:    • Marital Status:    Intimate Partner Violence:    • Fear of Current or Ex-Partner:    • Emotionally Abused:    • Physically Abused:    • Sexually Abused:        History reviewed. No pertinent family history.    Allergies  Patient has no known allergies.    Review of Systems  Negative    Physical Exam  Constitutional:       Appearance: Normal appearance.   HENT:      Head: Normocephalic and atraumatic.      Left Ear: External ear normal.      Nose: Nose normal.      Mouth/Throat:      Pharynx: Oropharynx is clear.   Eyes:      Conjunctiva/sclera: Conjunctivae normal.   Cardiovascular:      Rate and Rhythm: Normal rate and regular rhythm.      Pulses: Normal pulses.   Pulmonary:      Effort: Pulmonary effort is normal.   Abdominal:      Palpations: Abdomen is soft.   Musculoskeletal:      Cervical back: Normal range of motion.      Comments: R shoulder with mild decrease in ROM and apprehension with ER. Overall good RC strength. DNVI   Neurological:      Mental Status: He is alert.         Vital Signs  Blood Pressure: 147/64   Temperature: 36.1 °C (97 °F)   Pulse: 71   Respiration: 16   Pulse Oximetry: 98 %       Labs:                    Radiology:  No orders to display         Assessment/Plan:  Pre-Op Diagnosis Codes:     * Shoulder dislocation, right, initial encounter [S43.004A]  Procedure(s):  ARTHROSCOPY, SHOULDER - WITH CAPSULE LABRAL REPAIR AND KELLER

## 2021-07-28 NOTE — ANESTHESIA POSTPROCEDURE EVALUATION
Patient: John Paul Shoemaker    Procedure Summary     Date: 07/28/21 Room / Location:  OR 06 / SURGERY [facility]    Anesthesia Start: 1024 Anesthesia Stop: 1224    Procedure: ARTHROSCOPY, SHOULDER - WITH CAPSULE LABRAL REPAIR AND KELLER (Right Shoulder) Diagnosis:       Shoulder dislocation, right, initial encounter      (Shoulder dislocation, right, initial encounter [S43.004A])    Surgeons: Wali Seay M.D. Responsible Provider: Edgar Montero M.D.    Anesthesia Type: general, peripheral nerve block ASA Status: 1          Final Anesthesia Type: general, peripheral nerve block  Last vitals  BP   Blood Pressure: 112/51    Temp   36 °C (96.8 °F)    Pulse   72   Resp   12    SpO2   100 %      Anesthesia Post Evaluation    Patient location during evaluation: PACU  Patient participation: complete - patient participated  Level of consciousness: awake and alert  Pain score: 3    Airway patency: patent  Anesthetic complications: no  Cardiovascular status: hemodynamically stable  Respiratory status: acceptable  Hydration status: euvolemic    PONV: none          No complications documented.     Nurse Pain Score: 3 (NPRS)

## 2021-07-28 NOTE — ANESTHESIA TIME REPORT
Anesthesia Start and Stop Event Times     Date Time Event    7/28/2021 1024 Ready for Procedure     1024 Anesthesia Start     1224 Anesthesia Stop        Responsible Staff  07/28/21    Name Role Begin End    Edgar Montero M.D. Anesth 1024 1224        Preop Diagnosis (Free Text):  Pre-op Diagnosis     Shoulder dislocation, right, initial encounter [S43.004A]        Preop Diagnosis (Codes):  Diagnosis Information     Diagnosis Code(s): Shoulder dislocation, right, initial encounter [S43.004A]        Post op Diagnosis  Shoulder dislocation, right, initial encounter      Premium Reason  Non-Premium    Comments:

## 2021-07-28 NOTE — ANESTHESIA PREPROCEDURE EVALUATION
Relevant Problems   ANESTHESIA (within normal limits)      PULMONARY (within normal limits)      NEURO (within normal limits)      CARDIAC (within normal limits)      GI (within normal limits)       (within normal limits)      ENDO (within normal limits)      Other   (positive) Chronic dislocation of right shoulder       Physical Exam    Airway   Mallampati: II  TM distance: >3 FB  Neck ROM: full       Cardiovascular - normal exam  Rhythm: regular  Rate: normal  (-) murmur     Dental - normal exam           Pulmonary - normal exam  Breath sounds clear to auscultation     Abdominal    Neurological - normal exam                 Anesthesia Plan    ASA 1       Plan - general and peripheral nerve block     Peripheral nerve block will be post-op pain control  Airway plan will be LMA          Induction: intravenous    Postoperative Plan: Postoperative administration of opioids is intended.    Pertinent diagnostic labs and testing reviewed    Informed Consent:    Anesthetic plan and risks discussed with patient.    Use of blood products discussed with: patient whom consented to blood products.

## 2021-07-28 NOTE — OP REPORT
DATE OF OPERATION: 7/28/2021    SURGEON: Wali Seay MD     ASSISTANT: SONY Oliver    PREOPERATIVE DIAGNOSES:  1.  Right shoulder recurrent anterior inferior instability.  2.  Right shoulder retained hardware.  3.  Right shoulder Hill-Sachs lesion.    POSTOPERATIVE DIAGNOSES:  1.  Right shoulder recurrent anterior inferior instability.  2.  Right shoulder retained hardware.  3.  Right shoulder Hill-Sachs lesion, engaging.  4.  Right shoulder chondromalacia.  5.  Right shoulder partial-thickness undersurface supraspinatus tear.  6.  Right shoulder posterior instability.    PROCEDURE:  1.  Right shoulder arthroscopy with capsule labral repair.  2.  Right shoulder arthroscopy with hardware removal.  3.  Right shoulder arthroscopy with limited debridement.  4.  Right shoulder arthroscopy with rotator cuff tenodesis.    ANESTHESIA: Jeffrey Montero MD    ANESTHETIC: LMA anesthesia along with an interscalene block for postoperative pain control.    INDICATIONS: The patient has failed non-operative treatment and elected to proceed with operative intervention.   The patient was explained the risks, benefits, alternatives, procedure and recovery in   detail. All questions were answered. Informed consent was obtained. Site   verification per protocol was obtained in the pre-op holding area and the operating   room. Patient received appropriate preoperative antibiotics.    OPERATION: After successful anesthesia, the patient was carefully placed into a lateral decubitus position.  An axillary roll was used.  The patient was supported with a beanbag.  An examination of the right shoulder revealed anterior and posterior instability.  No significant sulcus sign.  The arm was then prepped and draped in the usual sterile fashion.  The arm was placed into inline traction with a slight abduction force.  At that point, a posterior portal was established with a knife and blunt trocar.  A diagnostic arthroscopy was performed in the  glenohumeral joint.  The subscapularis was normal to visual inspection and rotation.  The biceps tendon was normal to inspection.  The superior labrum was normal.  The anterior inferior labrum was torn and displaced medially.  There were several anchors that had been pulled out.  These and the sutures were removed.  I was able to roughen up the anterior glenoid and mobilized the labral complex.  I then placed 4 Ibeth tack Sidman anchors from the 1:00 to 5:30 position.  I was able to pull the capsule and labrum up superiorly and laterally and recreate a good buttress with great fixation.  He had some minor chondromalacia on the anterior humerus and glenoid.  This was smoothed out with a shaver.  The drive-through sign was then eliminated.  Next I transferred the arthroscope to the anterior lateral portal and felt I want to tighten up the posterior inferior glenohumeral ligament.  I placed another Ibeth tack anchor at about the 8 o'clock position and tied that securely to pull up the capsule and labrum.  I took care to protect the axillary nerve during the case.  Next, I felt that he had an engaging Hill-Sachs lesion.  I elected to do a rotator cuff tenodesis into the defect.  I roughened up the tuberosity and defect to bleeding bone.  I cleaned out the subacromial space where the repair was going to proceed.  I then placed 2 Ricky Iconix 3 anchors and tied 3 double pulley sutures in the subacromial space.  I then went back in the glenohumeral space and the tendon was pulled down into the Hill-Sachs lesion securely.  I was then happy with the case.  I lavaged out the subacromial space and the glenohumeral space and suctioned out the fluid.  I closed the portals with 3-0 Prolene interrupted fashion.  Sterile dressings and an immobilizer was applied..  The patient was transferred to recovery room in stable condition.    ESTIMATED BLOOD LOSS: Minimal.    COMPLICATIONS: None.    COMPONENTS: 5 Sidman Ibeth tack anchors, 2  Ricky Iconix 3 anchors.    SONY Oliver was medically necessary for the case. They helped with   instrumentation, retraction, and positioning. Without their help, the case   would not have been as technically successful.        ____________________________________  ANAYA RECINOS MD

## 2021-07-28 NOTE — OR NURSING
1222: To PACU from OR via gurney,  sleeping, respirations spontaneous and non-labored. Icepack applied over c/d/i R shoulder surgical dressings.  1232: Still sleeping, respirations spontaneous and non-labored.   1237: Pt awoke, helped him sit up in the gurney more. Pain is tolerable, no nausea.  1245: Pain is increased, but still tolerable, no nausea. Tolerating room air, awake and alert, asking multiple questions about the surgery.  1252: Dressing remains CDI. Tolerating room air.  1302: Pain increased, pain medication given per MAR, no nausea, still tolerating room air.  1312: Pain is more tolerable, no nausea, remains awake and alert.  1322: Pain remains tolerable, no nausea, awake and alert, still tolerating room air.  1332: Meets criteria to transfer to Stage 2, no nausea, pain is tolerable, dressing CDI, on room air. Report called to YOAN Howe and pt readied for transfer.

## 2021-07-28 NOTE — LETTER
July 14, 2021    [facility]  [address] [place] [address]  [phone]    Patient Name: John Paul Shoemaker  Surgeon Name: Surgeon(s):  GARETT Berrios P.A.-C.  Surgery Facility: [facility] ([address] [facility])  Surgery Date: 7/28/2021    The time of your surgery is not final and may change up to and until the day of your surgery. You will be contacted 24-48 hours prior to your surgery date with your check-in and surgery time.    If you will not be at one of the below numbers please call/text the surgery scheduler at   Cell Phone: [phone]    BEFORE YOUR SURGERY  Pre Registration and/or Lab Work must be done within and no earlier than 28 days prior to your surgery date. Please call [phone] for an appointment as soon as possible.    Please refrain from smoking any substance after midnight prior to surgery. Smoking may interfere with the anesthetic and frequently produces nausea during the recovery period.    Continue taking all lifesaving medications. Including the morning of your surgery with small sip of water.    Please read the MEDICATION INSTRUCTIONS below completely.    DAY OF YOUR SURGERY  Nothing to eat or drink after midnight     Please arrive at the hospital/surgery center at the check-in time provided.     An adult will need to bring you and take you home after your surgery.     AFTER YOUR SURGERY  Post op Appointment:   Date: 08/05/2021   Time: 02:30PM   With: ANAYA RECINOS MD   Location: [address] [place] [phone]    - Therapy- Your first appointment should be 7-10  day(s) after your surgery. For your convenience we have 4 Physical Therapy locations: [place], [facility], [place], and [facility]. Call our office ASAP to schedule an appointment at [phone] or take the enclosed Therapy Prescription to a facility of your choice.    TIME OFF WORK  FMLA or Disability forms can be faxed directly  to: [phone] or you may drop them off at [address] [place] [phone]. Our office charges a $20.00 fee per form. Forms will be completed within 10 business days of drop off and payment received. For the status of your forms you may contact our disability office directly [phone].    MEDICATION INSTRUCTIONS  The following medications should be stopped a minimum of 10 days prior to surgery:  All over the counter, Supplements & Herbal medications    Anorectics: Phentermine (Adipex-P, Lomaira and Suprenza), Phentermine-topiramate (Qsymia), Bupropion-naltrexone (Contrave)    Opiod Partial Agonists/Opioid Antagonists: Buprenorphine (Subocone, Belbuca, Butrans, Probuphine Implant, Sublocade), Naltrexone (ReVia, Vivitrol), Naloxone    Amphetamines: Dextroamphetamine/Amphetamine (Adderall, Mydayis), Methylphenidate Hydrochloride (Concerta, Metadate, Methylin, Ritalin)    The following medications should be stopped 5 days prior to surgery:  Blood Thinners: Any Aspirin, Aspirin products, anti-inflammatories such as ibuprofen and any blood thinners such as Coumadin and Plavix. Please consult your prescribing physician if you are on life saving blood thinners, in regards to when to stop medications prior to surgery.     The following medications should be stopped a minimum of 3 days prior to surgery:  PDE-5 inhibitors: Sildenafil (Viagra), Tadalafil (Cialis), Vardenafil (Levitra), Avanafil (Stendra)    MAO Inhibitors: Rasagiline (Azilect), Selegiline (Eldepryl, Emsam, Selapar), Isocarboxazid (Marplan), Phenelzine (Nardil)

## 2021-07-28 NOTE — DISCHARGE INSTR - OTHER INFO
May remove dressings Post op Day #2 and Shower with wound uncovered.  Apply bandaids after shower.  Do not soak or submerge incisions for two weeks. Ice and elevate extremity. Follow up 7-10 days.

## 2021-07-28 NOTE — DISCHARGE INSTRUCTIONS
ACTIVITY: Rest and take it easy for the first 24 hours.  A responsible adult is recommended to remain with you during that time.  It is normal to feel sleepy.  We encourage you to not do anything that requires balance, judgment or coordination.    MILD FLU-LIKE SYMPTOMS ARE NORMAL. YOU MAY EXPERIENCE GENERALIZED MUSCLE ACHES, THROAT IRRITATION, HEADACHE AND/OR SOME NAUSEA.    FOR 24 HOURS DO NOT:  Drive, operate machinery or run household appliances.  Drink beer or alcoholic beverages.   Make important decisions or sign legal documents.    SPECIAL INSTRUCTIONS:  Keep dressing clean, dry, and intact until removal on Friday, July 30th.  May remove dressings Post op Day #2 (Friday, July 30th) and Shower with wound uncovered.    Apply bandaids after shower, change bandaids daily if in use.  Do not soak or submerge incisions for two weeks.   Ice and elevate extremity.   Follow up 7-10 days, you have an appointment with Dr Seay on August 5th, and physical therapy on August 10th.    DIET: To avoid nausea, slowly advance diet as tolerated, avoiding spicy or greasy foods for the first day.  Add more substantial food to your diet according to your physician's instructions.  Babies can be fed formula or breast milk as soon as they are hungry.  INCREASE FLUIDS AND FIBER TO AVOID CONSTIPATION.    FOLLOW-UP APPOINTMENT:  A follow-up appointment should be arranged with your doctor in; call to schedule.    You should CALL YOUR PHYSICIAN if you develop:  Fever greater than 101 degrees F.  Pain not relieved by medication, or persistent nausea or vomiting.  Excessive bleeding (blood soaking through dressing) or unexpected drainage from the wound.  Extreme redness or swelling around the incision site, drainage of pus or foul smelling drainage.  Inability to urinate or empty your bladder within 8 hours.  Problems with breathing or chest pain.    You should call 911 if you develop problems with breathing or chest pain.  If you are  unable to contact your doctor or surgical center, you should go to the nearest emergency room or urgent care center.      Dr Seay's telephone #: [phone]    If any questions arise, call your doctor.  If your doctor is not available, please feel free to call the Surgical Center at [phone]. The Contact Center is open Monday through Friday 7AM to 5PM and may speak to a nurse at [phone], or toll free at [phone].     A registered nurse may call you a few days after your surgery to see how you are doing after your procedure.    MEDICATIONS: Resume taking daily medication.  Take prescribed pain medication with food.  If no medication is prescribed, you may take non-aspirin pain medication if needed.  PAIN MEDICATION CAN BE VERY CONSTIPATING.  Take a stool softener or laxative such as senokot, pericolace, or milk of magnesia if needed.    Prescription for ondansetron and Percocet electronically sent to [facility] on [address].  Last oral pain medication given at 1:02 PM.    If your physician has prescribed pain medication that includes Acetaminophen (Tylenol), do not take additional Acetaminophen (Tylenol) while taking the prescribed medication.    Depression / Suicide Risk    As you are discharged from this [facility] facility, it is important to learn how to keep safe from harming yourself.    Recognize the warning signs:  · Abrupt changes in personality, positive or negative- including increase in energy   · Giving away possessions  · Change in eating patterns- significant weight changes-  positive or negative  · Change in sleeping patterns- unable to sleep or sleeping all the time   · Unwillingness or inability to communicate  · Depression  · Unusual sadness, discouragement and loneliness  · Talk of wanting to die  · Neglect of personal appearance   · Rebelliousness- reckless behavior  · Withdrawal from people/activities they love  · Confusion- inability to concentrate     If you or a loved one observes  any of these behaviors or has concerns about self-harm, here's what you can do:  · Talk about it- your feelings and reasons for harming yourself  · Remove any means that you might use to hurt yourself (examples: pills, rope, extension cords, firearm)  · Get professional help from the community (Mental Health, Substance Abuse, psychological counseling)  · Do not be alone:Call your Safe Contact- someone whom you trust who will be there for you.  · Call your local CRISIS HOTLINE 915-8282 or 902-577-3024  · Call your local Children's Mobile Crisis Response Team [place] (211) 646-8241 or www."[employer] The Simple[employer] Fast and Elegant Tablet [employer] App"  · Call the toll free National Suicide Prevention Hotlines   · National Suicide Prevention Lifeline 892-956-AIBR (3605)  [employer] Line Network 800-SUICIDE (715-9982)    Peripheral Nerve Block Discharge Instructions from Same Day Surgery and Inpatient :    What to Expect - Upper Extremity  · You may experience numbness and weakness in shoulder  on the same side as your surgery  · This is normal. For some people, this may be an unpleasant sensation. Be very careful with your numb limb  · Ask for help when you need it  Shoulder Surgery Side Effects  · In addition to numbness and weakness you may experience other symptoms  · Other nerves that are close to those nerves injected can also be affected by local anesthesia  · You may experience a hoarseness in your voice  · Your breathing may feel different  · You may also notice drooping of your eyelid, pupil constriction, and decreased sweating, on the side of your surgery  · All of these side effects are normal and will resolve when the local anesthetic wears off   Prevent Injury  · Protect the limb like a baby  · Beware of exposing your limb to extreme heat or cold or trauma  · The limb may be injured without you noticing because it is numb  · Keep the limb elevated whenever possible  · Do not sleep on the limb  · Change the position of the limb  "regularly  · Avoid putting pressure on your surgical limb  Pain Control  · The initial block on the day of surgery will make your extremity feel \"numb\"  · Any consecutive injection including prior to discharge from the hospital will make your extremity feel \"numb\"  · You may feel an aching or burning when the local anesthesia starts to wear off  · Take pain pills as prescribed by your surgeon  · Call your surgeon or anesthesiologist if you do not have adequate pain control    "

## 2021-07-28 NOTE — ANESTHESIA PROCEDURE NOTES
Peripheral Block    Date/Time: 7/28/2021 10:15 AM  Performed by: Edgar Montero M.D.  Authorized by: Edgar Montero M.D.     Patient Location:  Pre-op  Start Time:  7/28/2021 10:15 AM  End Time:  7/28/2021 10:19 AM  Reason for Block: at surgeon's request and post-op pain management ONLY    patient identified, IV checked, site marked, risks and benefits discussed, surgical consent, monitors and equipment checked, pre-op evaluation and timeout performed    Patient Position:  Supine  Prep: ChloraPrep    Monitoring:  Heart rate, continuous pulse ox and cardiac monitor  Block Region:  Upper Extremity  Upper Extremity - Block Type:  BRACHIAL PLEXUS block, Interscalene approach    Laterality:  Right  Procedures: ultrasound guided  Image captured, interpreted and electronically stored.  Local Infiltration:  Lidocaine  Strength:  1 %  Dose:  3 ml  Block Type:  Single-shot  Needle Length:  100mm  Needle Gauge:  21 G  Needle Localization:  Ultrasound guidance  Injection Assessment:  Negative aspiration for heme, no paresthesia on injection, incremental injection and local visualized surrounding nerve on ultrasound  Evidence of intravascular injection: No     US Guided Interscalene Brachial Plexus Block   US transducer placed on the neck in oblique plane approximately at the level of C6.  Anterior and Middle Scalene (MSM) muscles identified with nerve trunks identified between the muscles.  Needle inserted lateral to probe and advanced under direct visualization through the MSM into a perineural position.  After negative aspiration, LA injected with ease and visualized surrounding the nerve trunks.

## 2021-08-10 ENCOUNTER — APPOINTMENT (OUTPATIENT)
Dept: PHYSICAL THERAPY | Facility: MEDICAL CENTER | Age: 26
End: 2021-08-10
Attending: ORTHOPAEDIC SURGERY
Payer: MEDICAID

## 2021-08-10 NOTE — OP THERAPY EVALUATION
"  Outpatient Physical Therapy  INITIAL EVALUATION    [facility]  [address]  [place] [address]  Phone:  [phone]  Fax:  [phone]    Date of Evaluation: 08/10/2021    Patient: John Paul Shoemaker  YOB: 1995  MRN: [id number]     Referring Provider: Wali Seay M.D.  [address] [place] [address]   Referring Diagnosis Chronic dislocation of right shoulder [M24.411]     Time Calculation                 Chief Complaint: No chief complaint on file.    {No diagnosis found. (Refresh or delete this SmartLink)}    Date of onset of impairment: No data found    Subjective:   History of Present Illness:     Date of surgery:  7/28/2021    Mechanism of injury:  Right shoulder arthroscopy with capsule labral repair on 7/28/2021. Chronic right shoulder dislocation.     The patient was given icing and activity instructions. The sling will be used for comfort, but the patient will try to get the arm out of the sling as much as possible and begin range of motion for the elbow, forearm, wrist, hand and fingers. Physical therapy and home exercise program was discussed with patient. Follow up in 4-6 weeks.     Protocol: No passive shoulder elevation beyond 90 degrees, no external rotation beyond 45 degrees, no IR behind back.       Past Medical History:   Diagnosis Date   • Heart burn    • High cholesterol    • Jaundice     \"as a baby\"   • Migraine     Since age 8 yrs   • Migraines    • Social anxiety disorder    • Ulcerative colitis (HCC)      Past Surgical History:   Procedure Laterality Date   • PB SHLDR ARTHROSCOP,DIAGNOSTIC Right 7/28/2021    Procedure: ARTHROSCOPY, SHOULDER - WITH CAPSULE LABRAL REPAIR AND KELLER;  Surgeon: Wali Seay M.D.;  Location: SURGERY [facility];  Service: Orthopedics   • ADENOIDECTOMY      s a small child   • DENTAL SURGERY  0226   • MAMMOPLASTY REDUCTION      Age 18   • SHOULDER ARTHROSCOPY Right      Social " History     Tobacco Use   • Smoking status: Never Smoker   • Smokeless tobacco: Never Used   Substance Use Topics   • Alcohol use: Yes     Comment: 4-5/wk glasses o wine     Family and Occupational History     Socioeconomic History   • Marital status: Single     Spouse name: Not on file   • Number of children: Not on file   • Years of education: Not on file   • Highest education level: Not on file   Occupational History   • Not on file       Objective      Therapeutic Exercises (CPT 20433):     1. Pendulum    2. Scap retraction     3. Sub max isometric abduction, ER< IR, extension       Time-based treatments/modalities:           Assessment/Response/Plan    Functional Assessment Used        Referring provider co-signature:  I have reviewed this plan of care and my co-signature certifies the need for services.    Certification Period: 08/10/2021 to  {Future Date:85533}    Physician Signature: ________________________________ Date: ______________

## 2022-01-26 ENCOUNTER — TELEPHONE (OUTPATIENT)
Dept: INTERNAL MEDICINE | Facility: OTHER | Age: 27
End: 2022-01-26

## 2022-01-27 PROBLEM — R68.89 COLD INTOLERANCE: Status: ACTIVE | Noted: 2021-03-25

## 2022-01-27 PROBLEM — K51.90 ULCERATIVE COLITIS (HCC): Status: ACTIVE | Noted: 2018-03-07

## 2022-01-27 PROBLEM — Z82.49 FAMILY HISTORY OF CARDIAC DISORDER: Status: ACTIVE | Noted: 2021-03-25

## 2022-01-27 PROBLEM — F41.9 ANXIETY DISORDER, UNSPECIFIED: Status: ACTIVE | Noted: 2021-03-25

## 2022-01-27 PROBLEM — F10.10 ALCOHOL CONSUMPTION BINGE DRINKING: Status: ACTIVE | Noted: 2019-10-25

## 2022-01-27 PROBLEM — N53.19 ABNORMAL EJACULATION: Status: ACTIVE | Noted: 2021-05-17

## 2022-01-27 RX ORDER — FLUOXETINE 10 MG/1
10 CAPSULE ORAL DAILY
Qty: 90 CAPSULE | Refills: 1 | Status: SHIPPED | OUTPATIENT
Start: 2022-01-27 | End: 2022-08-29 | Stop reason: SDUPTHER

## 2022-01-27 NOTE — TELEPHONE ENCOUNTER
Caller Name: John Paul Shoemaker    Call Back Number: [phone]    How would the patient prefer to be contacted with a response: Phone call OK to leave a detailed message    MEDICATIONS: Patient called and is wanting to know if he can cut his Fluoxetine down to 10mg again. He stated he started on the 10mg then was raised to 20mg, but he is doing fine and does not feel like he should be doing 20mg, if okay to do 10mg please send in a new prescription.

## 2022-01-27 NOTE — TELEPHONE ENCOUNTER
Phone Number Called: [phone]  Call outcome: Spoke to patient regarding message below.    Message: pt aware of medication being changed to 10mg and new prescription being sent.

## 2022-03-11 ENCOUNTER — TELEPHONE (OUTPATIENT)
Dept: INTERNAL MEDICINE | Facility: OTHER | Age: 27
End: 2022-03-11
Payer: MEDICAID

## 2022-03-11 NOTE — TELEPHONE ENCOUNTER
[facility] requiring signature, they will be in your folder on my desk

## 2022-08-26 ENCOUNTER — TELEPHONE (OUTPATIENT)
Dept: INTERNAL MEDICINE | Facility: OTHER | Age: 27
End: 2022-08-26
Payer: MEDICAID

## 2022-08-26 DIAGNOSIS — F41.9 ANXIETY DISORDER, UNSPECIFIED TYPE: ICD-10-CM

## 2022-08-26 NOTE — TELEPHONE ENCOUNTER
Pt is requesting med refills prescribed by Dr. Sidhu. I made him an appointment to reestablish, but he is completley out of medications as of today

## 2022-08-29 RX ORDER — FLUOXETINE 10 MG/1
10 CAPSULE ORAL DAILY
Qty: 30 CAPSULE | Refills: 1 | Status: SHIPPED | OUTPATIENT
Start: 2022-08-29 | End: 2022-09-21 | Stop reason: SDUPTHER

## 2022-08-29 NOTE — TELEPHONE ENCOUNTER
Medication Refills- All meds  Next visit: 9/7/22 [place]  Was the patient seen in the last year in this department? Yes   Does patient have an active prescription for medications requested? No   Received Request Via: Pharmacy

## 2022-09-21 DIAGNOSIS — F41.9 ANXIETY DISORDER, UNSPECIFIED TYPE: ICD-10-CM

## 2022-09-21 RX ORDER — FLUOXETINE 10 MG/1
10 CAPSULE ORAL DAILY
Qty: 90 CAPSULE | Refills: 1 | Status: SHIPPED | OUTPATIENT
Start: 2022-09-21

## 2022-09-21 NOTE — TELEPHONE ENCOUNTER
Fluoxetine Update- Pharmacy comment: REQUEST FOR 90 DAYS PRESCRIPTION.    Please update and resend

## (undated) DEVICE — DRAPETIBURON SHOULDER W/POUCH - (5EA/CA)

## (undated) DEVICE — PACK SHOULDER ARTHROSCOPY SM - (2EA/CA)

## (undated) DEVICE — ELECTRODE 850 FOAM ADHESIVE - HYDROGEL RADIOTRNSPRNT (50/PK)

## (undated) DEVICE — SUCTION INSTRUMENT YANKAUER BULBOUS TIP W/O VENT (50EA/CA)

## (undated) DEVICE — SHAVER4.0 AGGRESSIVE + FORMLA (5EA/BX)

## (undated) DEVICE — TUBE CONNECTING SUCTION - CLEAR PLASTIC STERILE 72 IN (50EA/CA)

## (undated) DEVICE — SHAVER, 5.5 RESECTOR

## (undated) DEVICE — BAG SPONGE COUNT 10.25 X 32 - BLUE (250/CA)

## (undated) DEVICE — SPONGE GAUZESTER 4 X 4 4PLY - (128PK/CA)

## (undated) DEVICE — SENSOR SPO2 NEO LNCS ADHESIVE (20/BX) SEE USER NOTES

## (undated) DEVICE — CANISTER SUCTION RIGID RED 1500CC (40EA/CA)

## (undated) DEVICE — SUTURE 3-0 PROLENE PS-1 (12PK/BX)

## (undated) DEVICE — LACTATED RINGERS INJ 1000 ML - (14EA/CA 60CA/PF)

## (undated) DEVICE — HUMID-VENT HEAT AND MOISTURE EXCHANGE- (50/BX)

## (undated) DEVICE — CHLORAPREP 26 ML APPLICATOR - ORANGE TINT(25/CA)

## (undated) DEVICE — NEPTUNE 4 PORT MANIFOLD - (20/PK)

## (undated) DEVICE — GLOVE BIOGEL SZ 8 SURGICAL PF LTX - (50PR/BX 4BX/CA)

## (undated) DEVICE — NEEDLE W/FACET TIP DULL VERSION W/STIMULATION CABLE SONOPLEX 21G X 4 (10/EA)"

## (undated) DEVICE — MASK, LARYNGEAL AIRWAY #4

## (undated) DEVICE — WATER IRRIGATION STERILE 1000ML (12EA/CA)

## (undated) DEVICE — TUBING PUMP WITH CONNECTOR REDEUCE (1EA)

## (undated) DEVICE — NEEDLE SLIDER CHAMPION 45 DEG RIGHT SHUTTLE NITINOL (5EA/BX)

## (undated) DEVICE — SYRINGE 30 ML LL (56/BX)

## (undated) DEVICE — MASK ANESTHESIA ADULT  - (100/CA)

## (undated) DEVICE — SPIDER SHOULDER HOLDER (12EA/BX)

## (undated) DEVICE — GOWN WARMING STANDARD FLEX - (30/CA)

## (undated) DEVICE — ABLATOR WAND SERFAS 90-S CRUISE

## (undated) DEVICE — KIT ANESTHESIA W/CIRCUIT & 3/LT BAG W/FILTER (20EA/CA)

## (undated) DEVICE — GLOVE, LITE (PAIR)

## (undated) DEVICE — BIT DRILL ICONIX 1.4 (5EA/BX)

## (undated) DEVICE — NEEDLE SAFETY 18 GA X 1 1/2 IN (100EA/BX)

## (undated) DEVICE — CANNULA THREADED 5X75 (5EA/BX)

## (undated) DEVICE — TUBING PATIENT W/CONNECTOR REDEUCE (1EA)

## (undated) DEVICE — PROTECTOR ULNA NERVE - (36PR/CA)

## (undated) DEVICE — SODIUM CHL. IRRIGATION 0.9% 3000ML (4EA/CA 65CA/PF)

## (undated) DEVICE — HEAD HOLDER JUNIOR/ADULT

## (undated) DEVICE — CANNULA FULLY THREADED 8 X 75 (5EA/BX)

## (undated) DEVICE — SODIUM CHL IRRIGATION 0.9% 1000ML (12EA/CA)

## (undated) DEVICE — ELECTRODE DUAL RETURN W/ CORD - (50/PK)

## (undated) DEVICE — TOWEL STOP TIMEOUT SAFETY FLAG (40EA/CA)

## (undated) DEVICE — GOWN SURGICAL X-LARGE ULTRA - FILM-REINFORCED (20/CA)

## (undated) DEVICE — SUTURE GENERAL

## (undated) DEVICE — GLOVE BIOGEL INDICATOR SZ 8 SURGICAL PF LTX - (50/BX 4BX/CA)